# Patient Record
Sex: FEMALE | URBAN - METROPOLITAN AREA
[De-identification: names, ages, dates, MRNs, and addresses within clinical notes are randomized per-mention and may not be internally consistent; named-entity substitution may affect disease eponyms.]

---

## 2023-04-27 ENCOUNTER — APPOINTMENT (RX ONLY)
Dept: URBAN - METROPOLITAN AREA CLINIC 102 | Facility: CLINIC | Age: 68
Setting detail: DERMATOLOGY
End: 2023-04-27

## 2023-04-27 DIAGNOSIS — Z00.00 ENCOUNTER FOR GENERAL ADULT MEDICAL EXAMINATION WITHOUT ABNORMAL FINDINGS: ICD-10-CM

## 2023-04-27 DIAGNOSIS — M32.10 SYSTEMIC LUPUS ERYTHEMATOSUS, ORGAN OR SYSTEM INVOLVEMENT UNSPECIFIED: ICD-10-CM

## 2023-04-27 PROCEDURE — ? COUNSELING

## 2023-04-27 PROCEDURE — ? TREATMENT REGIMEN

## 2023-04-27 PROCEDURE — 99203 OFFICE O/P NEW LOW 30 MIN: CPT

## 2023-04-27 ASSESSMENT — LOCATION DETAILED DESCRIPTION DERM
LOCATION DETAILED: RIGHT MEDIAL BREAST 4-5:00 REGION
LOCATION DETAILED: LEFT VENTRAL PROXIMAL FOREARM

## 2023-04-27 ASSESSMENT — LOCATION SIMPLE DESCRIPTION DERM
LOCATION SIMPLE: RIGHT BREAST
LOCATION SIMPLE: LEFT FOREARM

## 2023-04-27 ASSESSMENT — LOCATION ZONE DERM
LOCATION ZONE: TRUNK
LOCATION ZONE: ARM

## 2023-04-27 NOTE — PROCEDURE: TREATMENT REGIMEN
Plan: No active rash present on exam today. Due to patients history of breast cancer with a prior lumpectomy performed in Aug 2021, will have patient return when flare occurs for a better determination. Preview of pictures appears as an inflammatory condition and less likely breast cancer.
Detail Level: Zone

## 2024-01-18 ENCOUNTER — APPOINTMENT (RX ONLY)
Dept: URBAN - METROPOLITAN AREA CLINIC 102 | Facility: CLINIC | Age: 69
Setting detail: DERMATOLOGY
End: 2024-01-18

## 2024-01-18 DIAGNOSIS — M32.10 SYSTEMIC LUPUS ERYTHEMATOSUS, ORGAN OR SYSTEM INVOLVEMENT UNSPECIFIED: ICD-10-CM

## 2024-01-18 PROCEDURE — ? COUNSELING

## 2024-01-18 PROCEDURE — 99212 OFFICE O/P EST SF 10 MIN: CPT

## 2024-01-18 PROCEDURE — ? ADDITIONAL NOTES

## 2024-01-18 ASSESSMENT — LOCATION DETAILED DESCRIPTION DERM: LOCATION DETAILED: RIGHT MEDIAL BREAST 2-3:00 REGION

## 2024-01-18 ASSESSMENT — LOCATION SIMPLE DESCRIPTION DERM: LOCATION SIMPLE: RIGHT BREAST

## 2024-01-18 ASSESSMENT — LOCATION ZONE DERM: LOCATION ZONE: TRUNK

## 2025-08-14 ENCOUNTER — APPOINTMENT (OUTPATIENT)
Dept: URBAN - METROPOLITAN AREA CLINIC 102 | Facility: CLINIC | Age: 70
Setting detail: DERMATOLOGY
End: 2025-08-14

## 2025-08-14 DIAGNOSIS — T07XXXA INSECT BITE, NONVENOMOUS, OF OTHER, MULTIPLE, AND UNSPECIFIED SITES, WITHOUT MENTION OF INFECTION: ICD-10-CM | Status: INADEQUATELY CONTROLLED

## 2025-08-14 DIAGNOSIS — L30.4 ERYTHEMA INTERTRIGO: ICD-10-CM | Status: INADEQUATELY CONTROLLED

## 2025-08-14 DIAGNOSIS — T07XXXA ABRASION OR FRICTION BURN OF OTHER, MULTIPLE, AND UNSPECIFIED SITES, WITHOUT MENTION OF INFECTION: ICD-10-CM | Status: INADEQUATELY CONTROLLED

## 2025-08-14 DIAGNOSIS — M32.10 SYSTEMIC LUPUS ERYTHEMATOSUS, ORGAN OR SYSTEM INVOLVEMENT UNSPECIFIED: ICD-10-CM

## 2025-08-14 PROBLEM — S80.862A INSECT BITE (NONVENOMOUS), LEFT LOWER LEG, INITIAL ENCOUNTER: Status: ACTIVE | Noted: 2025-08-14

## 2025-08-14 PROBLEM — S20.409A UNSPECIFIED SUPERFICIAL INJURIES OF UNSPECIFIED BACK WALL OF THORAX, INITIAL ENCOUNTER: Status: ACTIVE | Noted: 2025-08-14

## 2025-08-14 PROBLEM — S70.361A INSECT BITE (NONVENOMOUS), RIGHT THIGH, INITIAL ENCOUNTER: Status: ACTIVE | Noted: 2025-08-14

## 2025-08-14 PROCEDURE — ? COUNSELING

## 2025-08-14 PROCEDURE — ? PRESCRIPTION

## 2025-08-14 PROCEDURE — ? TREATMENT REGIMEN

## 2025-08-14 RX ORDER — TRIAMCINOLONE ACETONIDE 1 MG/G
OINTMENT TOPICAL
Qty: 30 | Refills: 1 | Status: ERX | COMMUNITY
Start: 2025-08-14

## 2025-08-14 RX ADMIN — TRIAMCINOLONE ACETONIDE: 1 OINTMENT TOPICAL at 00:00

## 2025-08-14 ASSESSMENT — LOCATION SIMPLE DESCRIPTION DERM
LOCATION SIMPLE: RIGHT BREAST
LOCATION SIMPLE: LEFT BREAST
LOCATION SIMPLE: LEFT CALF
LOCATION SIMPLE: RIGHT POSTERIOR THIGH
LOCATION SIMPLE: RIGHT UPPER BACK

## 2025-08-14 ASSESSMENT — LOCATION DETAILED DESCRIPTION DERM
LOCATION DETAILED: RIGHT SUPERIOR UPPER BACK
LOCATION DETAILED: LEFT DISTAL CALF
LOCATION DETAILED: RIGHT MEDIAL BREAST 2-3:00 REGION
LOCATION DETAILED: LEFT INFRAMAMMARY CREASE (INNER QUADRANT)
LOCATION DETAILED: RIGHT INFRAMAMMARY CREASE (INNER QUADRANT)
LOCATION DETAILED: RIGHT DISTAL POSTERIOR THIGH

## 2025-08-14 ASSESSMENT — SEVERITY ASSESSMENT: SEVERITY: MILD

## 2025-08-14 ASSESSMENT — LOCATION ZONE DERM
LOCATION ZONE: LEG
LOCATION ZONE: TRUNK